# Patient Record
Sex: MALE | Race: OTHER | NOT HISPANIC OR LATINO | URBAN - METROPOLITAN AREA
[De-identification: names, ages, dates, MRNs, and addresses within clinical notes are randomized per-mention and may not be internally consistent; named-entity substitution may affect disease eponyms.]

---

## 2017-10-08 ENCOUNTER — EMERGENCY (EMERGENCY)
Facility: HOSPITAL | Age: 62
LOS: 1 days | Discharge: HOME | End: 2017-10-08
Attending: EMERGENCY MEDICINE | Admitting: EMERGENCY MEDICINE
Payer: COMMERCIAL

## 2017-10-08 VITALS
TEMPERATURE: 98 F | OXYGEN SATURATION: 100 % | HEART RATE: 82 BPM | DIASTOLIC BLOOD PRESSURE: 90 MMHG | RESPIRATION RATE: 17 BRPM | SYSTOLIC BLOOD PRESSURE: 155 MMHG

## 2017-10-08 VITALS
HEIGHT: 68 IN | SYSTOLIC BLOOD PRESSURE: 148 MMHG | DIASTOLIC BLOOD PRESSURE: 84 MMHG | TEMPERATURE: 99 F | RESPIRATION RATE: 16 BRPM | WEIGHT: 307.99 LBS | HEART RATE: 82 BPM

## 2017-10-08 PROCEDURE — 76512 OPH US DX B-SCAN: CPT | Mod: 26,LT

## 2017-10-08 PROCEDURE — 76512 OPH US DX B-SCAN: CPT

## 2017-10-08 PROCEDURE — 99284 EMERGENCY DEPT VISIT MOD MDM: CPT

## 2017-10-08 PROCEDURE — 99284 EMERGENCY DEPT VISIT MOD MDM: CPT | Mod: 25

## 2017-10-08 RX ORDER — ASPIRIN/CALCIUM CARB/MAGNESIUM 324 MG
1 TABLET ORAL
Qty: 0 | Refills: 0 | COMMUNITY

## 2017-10-08 RX ORDER — TAMSULOSIN HYDROCHLORIDE 0.4 MG/1
2 CAPSULE ORAL
Qty: 0 | Refills: 0 | COMMUNITY

## 2017-10-08 RX ORDER — LANSOPRAZOLE 15 MG/1
0 CAPSULE, DELAYED RELEASE ORAL
Qty: 0 | Refills: 0 | COMMUNITY

## 2017-10-08 RX ORDER — SERTRALINE 25 MG/1
1 TABLET, FILM COATED ORAL
Qty: 0 | Refills: 0 | COMMUNITY

## 2017-10-08 NOTE — CONSULT NOTE ADULT - SUBJECTIVE AND OBJECTIVE BOX
HPI: 63 y/o male presents c/o right sided visual difficulties. Patient says yesterday while at [employer] he saw a bunch of floaters in his right eye that became 'cobweb' in appearance and persisted in his vision. Today patient states vision in the right eye severely deteriorated and now everything in the right eye is a blur. Patient denies any pain, double vision, headache or other symptoms.     PMH: Jann's esophagus , obesity  POcHx: Patient denies previous laser, surgery or trauma to the eye. Patient wears bifocals   Medications (systemic and drops): Patient does not use any eye drops. Patient on Flomax. Other meds per chart.   Allergies: NKDA    Ophthalmology Exam    Visual acuity CC:    20/400 OD,    20/30+ OS, PH: NI OD,    Pupils: Perrl, OU  Extraocular movements (EOMs): Full, Ortho OU  Ttono:    16 OD,  16 OS      Slit Lamp Exam (SLE)  External: wnl OU  Lids/Lashes/Lacrimal Ducts:  wnl OU  Sclera/Conjunctiva:  white and quiet OU  Cornea: clear OU  Anterior Chamber: Deep and quiet OU  Iris:  flat and formed OU  Lens:  2+NS OU    Fundus Exam  (dilated with 1% tropicamide and 2.5% phenylephrine   Patient aware that pupils can remained dilated for at least 4-6 hours  Exam performed with 20D lens    Vitreous:   Cup/Disc:   Macula:    Vessels:    Periphery:     A/P: 63 y/o m with right eye partial vision loss and retinal detachment seen on DFE  #Right Retinal Detachment  -Patient to follow tomorrow morning 8 am at for possible retinal detachment repair  -Do not eat or drink after midnight tonight as surgery may be scheduled tomorrow           Follow-Up:  Patient should follow up tomorrow morning with the [facility] tomorrow at 8 am    [address] [place] [address].  [place] [address]  [phone] HPI: 61 y/o male presents c/o right sided visual difficulties. Patient says yesterday while at [employer] he saw a bunch of floaters in his right eye that became 'cobweb' in appearance and persisted in his vision. Today patient states vision in the right eye severely deteriorated and now everything in the right eye is a blur. Patient denies any pain, double vision, headache or other symptoms.     PMH: Jann's esophagus , obesity  POcHx: Patient denies previous laser, surgery or trauma to the eye. Patient wears bifocals   Medications (systemic and drops): Patient does not use any eye drops. Patient on Flomax. Other meds per chart.   Allergies: NKDA    Ophthalmology Exam    Visual acuity CC:    20/400 OD,    20/30+ OS, PH: NI OD,    Pupils: Perrl, OU  Extraocular movements (EOMs): Full, Ortho OU  Ttono:    16 OD,  16 OS      Slit Lamp Exam (SLE)  External: wnl OU  Lids/Lashes/Lacrimal Ducts:  wnl OU  Sclera/Conjunctiva:  white and quiet OU  Cornea: clear OU  Anterior Chamber: Deep and quiet OU  Iris:  flat and formed OU  Lens:  2+NS OU    Fundus Exam  (dilated with 1% tropicamide and 2.5% phenylephrine   Patient aware that pupils can remained dilated for at least 4-6 hours  Exam performed with 20D lens    Vitreous: inferior vitreous heme OD, Clear OS  Cup/Disc: 0.3, difficult to visualize due to vitreous heme OD, 0.3 sharp, pink OS  Macula:  Difficult to visualize, however appears flat OD, flat OS  Vessels:  wnl OU  Periphery: Small peripheral super temporal tear OD, wnl OS    A/P: 61 y/o m with right eye partial vision loss and retinal tear with vitreous heme seen on DFE  #Right Retinal Tear with vitreous bleed   -Patient to follow tomorrow morning 7:30 am at [address] [place] [address] for retinal tear repair. Office procedure vs Surgery.   -Do not eat or drink after midnight tonight as surgery may be scheduled tomorrow   -Bed rest, sleep on right side and keep head of bed elevated  -Stop aspirin    Follow up tomorrow with [facility]   [address] [place] [address].  [place] [address]  [phone]    Case discussed with PGY4 Dr. Martinez and retinologist Dr. Rojas

## 2017-10-08 NOTE — ED PROVIDER NOTE - ATTENDING CONTRIBUTION TO CARE
Attending MD Catalan:  I personally have seen and examined this patient.  Resident note reviewed and agree on plan of care and except where noted.  See HPI, PE, and MDM for details.

## 2017-10-08 NOTE — ED ADULT NURSE REASSESSMENT NOTE - NS ED NURSE REASSESS COMMENT FT1
patient resting comfortably in no acute distress and patient denies pain at this time. pending US results

## 2017-10-08 NOTE — ED ADULT NURSE REASSESSMENT NOTE - NS ED NURSE REASSESS COMMENT FT1
patient resting comfortably in eye room in no acute distress. US results waiting to be discussed with patient.

## 2017-10-08 NOTE — ED ADULT NURSE REASSESSMENT NOTE - NS ED NURSE REASSESS COMMENT FT1
patient resting comfortably in bed in no acute distress. waiting for d/c. patient denies pain at this time,.

## 2017-10-08 NOTE — ED PROVIDER NOTE - PHYSICAL EXAMINATION
Attending MD Catalan: A & O x 3, NAD, EOMI b/l, PERRL b/l; lungs CTAB, heart with reg rhythm without murmur; abdomen soft NTND; extremities with no edema; affect appropriate. neuro exam non focal with no motor or sensory deficits. OD 20/200, OS 20/20.

## 2017-10-08 NOTE — ED PROVIDER NOTE - MEDICAL DECISION MAKING DETAILS
Attending MD Catalan: 62M with borderline DM presenting with acute onset floaters and diminished visual acuity, OD 20/200, OS 20/20, ddx includes retinal detachment, PVD. Plan for stat optho consult, paged for consult at [phone].

## 2017-10-08 NOTE — ED PROVIDER NOTE - CARE PLAN
Principal Discharge DX:	Retinal detachment, unspecified laterality  Secondary Diagnosis:	Vitreous hemorrhage, unspecified laterality

## 2017-10-08 NOTE — ED PROVIDER NOTE - PROGRESS NOTE DETAILS
Nemes - as per Ophtha, vitreous hemorrhage w retinal detachment. Will be seen tomorrow at 7.30 for procedure. Will DC

## 2017-10-08 NOTE — ED PROVIDER NOTE - OBJECTIVE STATEMENT
62M presenting with acute onset floaters and "cobwebs" over right eye and difficulty seeing x 1-2 hours. Denies pain, no headache. No lateralizing weakness. 62M presenting with acute onset floaters and "cobwebs" over right eye and difficulty seeing x 1-2 hours. Denies pain, no headache. No lateralizing weakness. No n/v, no f/c. Not a contact lens wearer, wears corrective lens. No facial pain, no ear pain.

## 2017-10-08 NOTE — ED ADULT NURSE NOTE - OBJECTIVE STATEMENT
63 yo male presents to the ED from [facility] c/o right eye visual changes at 1000. patient states he was conducting a mass and suddenly saw black curtain in visual field and is now c/o of right eye "floaters" and can only see outlines of objects. patient denies any eye trauma or pain. no redness, swelling or drainage present from right eye. patient is AAOx3. speech clear. upper and lower extremities equal in strength bilaterally with positive sensation. lung sounds clear bilaterally. cap refill <3sec. patient denies chest pain, SOB, fevers, chills, N/V/D, HA, dizziness, cough, abdominal pain. patient denies double vision. ELIZABETH. MD at the bedside.

## 2017-11-29 NOTE — ASU PATIENT PROFILE, ADULT - PMH
Barretts esophagus    GERD (gastroesophageal reflux disease)    Gout    Obesity    Sleep apnea  on cpap

## 2017-11-30 ENCOUNTER — OUTPATIENT (OUTPATIENT)
Dept: OUTPATIENT SERVICES | Facility: HOSPITAL | Age: 62
LOS: 1 days | End: 2017-11-30
Payer: COMMERCIAL

## 2017-11-30 ENCOUNTER — TRANSCRIPTION ENCOUNTER (OUTPATIENT)
Age: 62
End: 2017-11-30

## 2017-11-30 VITALS
HEIGHT: 68.5 IN | RESPIRATION RATE: 19 BRPM | DIASTOLIC BLOOD PRESSURE: 67 MMHG | WEIGHT: 306.88 LBS | HEART RATE: 69 BPM | OXYGEN SATURATION: 96 % | SYSTOLIC BLOOD PRESSURE: 126 MMHG | TEMPERATURE: 99 F

## 2017-11-30 VITALS
HEART RATE: 74 BPM | RESPIRATION RATE: 16 BRPM | OXYGEN SATURATION: 96 % | DIASTOLIC BLOOD PRESSURE: 69 MMHG | SYSTOLIC BLOOD PRESSURE: 131 MMHG

## 2017-11-30 DIAGNOSIS — H43.11 VITREOUS HEMORRHAGE, RIGHT EYE: ICD-10-CM

## 2017-11-30 DIAGNOSIS — Z96.659 PRESENCE OF UNSPECIFIED ARTIFICIAL KNEE JOINT: Chronic | ICD-10-CM

## 2017-11-30 PROCEDURE — C1784: CPT

## 2017-11-30 PROCEDURE — C1889: CPT

## 2017-11-30 PROCEDURE — 67108 REPAIR DETACHED RETINA: CPT | Mod: RT

## 2017-11-30 NOTE — ASU DISCHARGE PLAN (ADULT/PEDIATRIC). - NOTIFY
Bleeding that does not stop/Swelling that continues/Fever greater than 101/Persistent Nausea and Vomiting/Pain not relieved by Medications

## 2017-11-30 NOTE — ASU DISCHARGE PLAN (ADULT/PEDIATRIC). - PT EDUC
[employer] gas card, green bracelet, Eye shield with instructions , sunglasses and eye kit given to patient./other (specify)

## 2018-02-03 NOTE — ASU PATIENT PROFILE, ADULT - PRO ARRIVE FROM
"Discharge papers and TH amoxil given to pt mom.  First dose given in the ED.  Mom verbalizes understanding of d/c dx, to follow up with PCP next week, and to continue giving atbx as prescribed.  Temp now 99.5 tympanic.  Child states she \"feels better\" when asked if she was still having a sore throat.  Discharged to home with mom to transport.    "
MD at bedside.    
home

## 2018-08-28 PROBLEM — G47.30 SLEEP APNEA, UNSPECIFIED: Chronic | Status: ACTIVE | Noted: 2017-11-30

## 2018-08-28 PROBLEM — K22.70 BARRETT'S ESOPHAGUS WITHOUT DYSPLASIA: Chronic | Status: ACTIVE | Noted: 2017-10-08

## 2018-08-28 PROBLEM — K21.9 GASTRO-ESOPHAGEAL REFLUX DISEASE WITHOUT ESOPHAGITIS: Chronic | Status: ACTIVE | Noted: 2017-11-30

## 2018-08-28 PROBLEM — E66.9 OBESITY, UNSPECIFIED: Chronic | Status: ACTIVE | Noted: 2017-10-08

## 2018-08-28 PROBLEM — M10.9 GOUT, UNSPECIFIED: Chronic | Status: ACTIVE | Noted: 2017-11-30

## 2018-08-29 ENCOUNTER — TRANSCRIPTION ENCOUNTER (OUTPATIENT)
Age: 63
End: 2018-08-29

## 2018-08-29 NOTE — ASU PATIENT PROFILE, ADULT - PMH
Barretts esophagus    GERD (gastroesophageal reflux disease)    Gout    Obesity    Sleep apnea  on cpap Barretts esophagus    Benign prostatic hypertrophy    GERD (gastroesophageal reflux disease)    Gout    HTN (hypertension)    Obesity    Sleep apnea  on cpap

## 2018-08-29 NOTE — ASU PATIENT PROFILE, ADULT - PSH
S/P knee replacement  bilateral History of retinal detachment  right  S/P knee replacement  bilateral

## 2018-08-30 ENCOUNTER — OUTPATIENT (OUTPATIENT)
Dept: OUTPATIENT SERVICES | Facility: HOSPITAL | Age: 63
LOS: 1 days | End: 2018-08-30
Payer: COMMERCIAL

## 2018-08-30 VITALS
RESPIRATION RATE: 22 BRPM | SYSTOLIC BLOOD PRESSURE: 116 MMHG | OXYGEN SATURATION: 98 % | DIASTOLIC BLOOD PRESSURE: 58 MMHG | HEART RATE: 76 BPM

## 2018-08-30 VITALS
DIASTOLIC BLOOD PRESSURE: 59 MMHG | HEIGHT: 67.5 IN | SYSTOLIC BLOOD PRESSURE: 125 MMHG | TEMPERATURE: 99 F | HEART RATE: 70 BPM | RESPIRATION RATE: 20 BRPM | OXYGEN SATURATION: 97 % | WEIGHT: 315 LBS

## 2018-08-30 DIAGNOSIS — H25.011 CORTICAL AGE-RELATED CATARACT, RIGHT EYE: ICD-10-CM

## 2018-08-30 DIAGNOSIS — Z86.69 PERSONAL HISTORY OF OTHER DISEASES OF THE NERVOUS SYSTEM AND SENSE ORGANS: Chronic | ICD-10-CM

## 2018-08-30 DIAGNOSIS — Z96.659 PRESENCE OF UNSPECIFIED ARTIFICIAL KNEE JOINT: Chronic | ICD-10-CM

## 2018-08-30 PROCEDURE — V2632: CPT

## 2018-08-30 PROCEDURE — 66984 XCAPSL CTRC RMVL W/O ECP: CPT | Mod: RT

## 2018-08-30 NOTE — ASU DISCHARGE PLAN (ADULT/PEDIATRIC). - PT EDUC
other (specify)/Intraocular lens implant (IOL), Eye shield with instructions , sunglasses and eye kit given to patient./Implant card (specify)

## 2018-08-30 NOTE — ASU DISCHARGE PLAN (ADULT/PEDIATRIC). - SPECIAL INSTRUCTIONS
Your eye patch will remain on overnight. Your doctor will remove it at your appointment tomorrow and instruct you on what drops to take at that time. You may remove the soft dressing and replace it with the shield overnighht. Bring the eye kit and all of your other eye medications  to the office for each visit. You may experience some itching or scratchiness following surgery. This is normal and is usually relieved with Tylenol which can be taken 650mg by mouth every 4-6 hours for pain. Avoid Aspirin or Motrin. If you experience persistent decrease in vision, pain, excessive bleeding , temperature above 101, persistent nausea or vomiting contact your surgeon at [phone].

## 2020-09-27 NOTE — ASU DISCHARGE PLAN (ADULT/PEDIATRIC). - POST OP PHONE #
Problem: Falls - Risk of:  Goal: Will remain free from falls  Description: Will remain free from falls  Outcome: Ongoing  Goal: Absence of physical injury  Description: Absence of physical injury  Outcome: Ongoing     Problem: Restraint Use - Nonviolent/Non-Self-Destructive Behavior:  Goal: Absence of restraint indications  Description: Absence of restraint indications  Outcome: Ongoing  Goal: Absence of restraint-related injury  Description: Absence of restraint-related injury  Outcome: Ongoing     Problem: Airway Clearance - Ineffective  Goal: Achieve or maintain patent airway  Outcome: Ongoing     Problem: Gas Exchange - Impaired  Goal: Absence of hypoxia  Outcome: Ongoing  Goal: Promote optimal lung function  Outcome: Ongoing     Problem: Breathing Pattern - Ineffective  Goal: Ability to achieve and maintain a regular respiratory rate  Outcome: Ongoing     Problem:  Body Temperature -  Risk of, Imbalanced  Goal: Ability to maintain a body temperature within defined limits  Outcome: Ongoing  Goal: Will regain or maintain usual level of consciousness  Outcome: Ongoing  Goal: Complications related to the disease process, condition or treatment will be avoided or minimized  Outcome: Ongoing     Problem: Isolation Precautions - Risk of Spread of Infection  Goal: Prevent transmission of infection  Outcome: Ongoing     Problem: Nutrition Deficits  Goal: Optimize nutrtional status  Outcome: Ongoing     Problem: Risk for Fluid Volume Deficit  Goal: Maintain normal heart rhythm  Outcome: Ongoing  Goal: Maintain absence of muscle cramping  Outcome: Ongoing  Goal: Maintain normal serum potassium, sodium, calcium, phosphorus, and pH  Outcome: Ongoing     Problem: Loneliness or Risk for Loneliness  Goal: Demonstrate positive use of time alone when socialization is not possible  Outcome: Ongoing     Problem: Fatigue  Goal: Verbalize increase energy and improved vitality  Outcome: Ongoing     Problem: Patient Education: Go to Patient Education Activity  Goal: Patient/Family Education  Outcome: Ongoing     Problem: Skin Integrity:  Goal: Will show no infection signs and symptoms  Description: Will show no infection signs and symptoms  Outcome: Ongoing  Goal: Absence of new skin breakdown  Description: Absence of new skin breakdown  Outcome: Ongoing     Problem: Pain:  Description: Pain management should include both nonpharmacologic and pharmacologic interventions.   Goal: Pain level will decrease  Description: Pain level will decrease  Outcome: Ongoing  Goal: Control of acute pain  Description: Control of acute pain  Outcome: Ongoing  Goal: Control of chronic pain  Description: Control of chronic pain  Outcome: Ongoing [phone]

## 2021-04-05 ENCOUNTER — TRANSCRIPTION ENCOUNTER (OUTPATIENT)
Age: 66
End: 2021-04-05

## 2021-06-08 ENCOUNTER — TRANSCRIPTION ENCOUNTER (OUTPATIENT)
Age: 66
End: 2021-06-08

## 2021-10-11 ENCOUNTER — TRANSCRIPTION ENCOUNTER (OUTPATIENT)
Age: 66
End: 2021-10-11

## 2021-12-13 ENCOUNTER — TRANSCRIPTION ENCOUNTER (OUTPATIENT)
Age: 66
End: 2021-12-13

## 2022-03-02 NOTE — ED ADULT NURSE NOTE - DOES PATIENT HAVE ADVANCE DIRECTIVE
-- DO NOT REPLY / DO NOT REPLY ALL --  -- Message is from the [facility]--    General Patient Message      Reason for Call: BLAKE Odonnell RN, please call back patient in regards to a \"return to work letter\".    Caller Information       Type Contact Phone    03/01/2022 11:49 AM CST Phone (Incoming) Day Guthrie (Self) [phone] (M)          Alternative phone number: None    Turnaround time given to caller:   \"This message will be sent to [state Provider's name]. The clinical team will fulfill your request as soon as they review your message.\"    
Spoke with pt   
unknown

## 2022-05-26 ENCOUNTER — NON-APPOINTMENT (OUTPATIENT)
Age: 67
End: 2022-05-26

## 2022-06-21 ENCOUNTER — NON-APPOINTMENT (OUTPATIENT)
Age: 67
End: 2022-06-21

## 2022-06-22 ENCOUNTER — NON-APPOINTMENT (OUTPATIENT)
Age: 67
End: 2022-06-22

## 2022-07-01 ENCOUNTER — NON-APPOINTMENT (OUTPATIENT)
Age: 67
End: 2022-07-01

## 2023-03-21 NOTE — ASU PATIENT PROFILE, ADULT - PATIENT REPRESENTATIVE PHONE
waiting room Mirvaso Counseling: Mirvaso is a topical medication which can decrease superficial blood flow where applied. Side effects are uncommon and include stinging, redness and allergic reactions.

## 2023-07-11 NOTE — ASU PREOP CHECKLIST - ASSESSMENT, HISTORY & PHYSICAL COMPLETED AND ON MEDICAL RECORD
Future Appointments    Encounter Information    Provider Department Appt Notes   8/21/2023 Kelly Andrade, [address] [facility] Pulmonology Sleep and Critical Care lung nodule 3 m   9/5/2023 Will Jensen MD [address] [facility] Primary Care Return in about 3 months     Past Visits    Date Provider Specialty Visit Type Primary Dx   06/05/2023 Will Jensen MD Primary Care Office Visit Pre-diabetes done